# Patient Record
(demographics unavailable — no encounter records)

---

## 2024-11-15 NOTE — ASSESSMENT
[FreeTextEntry1] : He has 3 lesions in the left parotid gland which are likely intraparotid lymph nodes.  We reviewed the ultrasound results.  Cysts versus Warthin's tumor could not be ruled out.  Malignancy was not felt to be likely.  The lesion he had palpated has essentially resolved.  He has mildly asymmetric tonsils.  They are benign appearing.  He has no symptoms  Plan -Findings and management options were discussed with the patient. - We discussed management options for the parotid gland lesions.  He may consider ultrasound-guided FNA or an MRI.  He was told the only way to rule out malignancy would be to perform a biopsy.  At this point, he would like to obtain the MRI.  The MRI will also image the tonsils. - We will continue to monitor the tonsils closely - Follow-up after the MRI - Call and return earlier if any problems

## 2024-11-15 NOTE — PHYSICAL EXAM
[TextEntry] : General: The patient was alert, oriented and in no distress. Voice was clear.  Face: The patient had no facial asymmetry or mass (see neck exam). The skin was unremarkable.  Ears: Hearing normal to conversational voice External ears were normal without deformity. Ear canals were clear. No cerumen or inflammation Tympanic membranes were intact and normal. No perforation or effusion. mobile  Nose: The external nose had no significant deformity. On anterior rhinoscopy, the nasal mucosa was clear. The anterior septum was mildly deviated. There were no visualized polyps, purulence or masses.  Oral cavity: Oral mucosa- normal. Oral and base of tongue- clear and without mass. Gingival and buccal mucosa- moist and without lesions. Palate- the palate moved well. There was no cleft palate. There appeared to be good salivary flow. Oral cavity/oropharynx- no pus, erythema or mass there was asymmetry of the tonsils. the right tonsil was 2-3+ in size, the left tonsil was 1-2+ in size. both were soft and non-tender.  Neck: The neck was symmetrical. The parotid and submandibular glands were normal without masses. The trachea was midline and there was no unusual crepitus. Thyroid was smooth and nontender and no masses were palpated. no masses  Lymphatics: Cervical adenopathy- none.

## 2024-11-15 NOTE — HISTORY OF PRESENT ILLNESS
[de-identified] : - 9/13/24-  LUIZA ARCHER is a 45 year old patient here for 3-week history of a cyst or bump near his left ear. He thinks it has decreased in size this past week. There is no pain. He had no preceding event. He has no history of skin cancer or skin lesions. He has no nasal or throat symptoms. He does smoke. - [FreeTextEntry1] : - 11/15/24- LUIZA ARCHER is a 45 year old patient Here to review his ultrasound.  He had noticed a small cyst or bump near his left ear which started in late August.  He thinks it has decreased in size.  He was also noted to have mildly asymmetric tonsils.  He has no throat symptoms although he does smoke.  Nasal endoscopy and flexible laryngoscopy showed no suspicious masses or lesions.  Ultrasound showed 3 lesions in the left parotid gland likely representing intraparotid lymph nodes although simple cysts or cystic neoplasm such as Warthin's tumor cannot be ruled out.  Malignancy was not likely.  MRI or tissue sampling could be obtained for further evaluation  Translation service used.

## 2025-02-24 NOTE — HISTORY OF PRESENT ILLNESS
[de-identified] :  - 9/13/24- LUIZA ARCHER is a 45 year old patient here for 3-week history of a cyst or bump near his left ear. He thinks it has decreased in size this past week. There is no pain. He had no preceding event. He has no history of skin cancer or skin lesions. He has no nasal or throat symptoms. He does smoke. -   Interval History: - 11/15/24- LUIZA ARCHER is a 45 year old patient Here to review his ultrasound. He had noticed a small cyst or bump near his left ear which started in late August. He thinks it has decreased in size. He was also noted to have mildly asymmetric tonsils. He has no throat symptoms although he does smoke. Nasal endoscopy and flexible laryngoscopy showed no suspicious masses or lesions. Ultrasound showed 3 lesions in the left parotid gland likely representing intraparotid lymph nodes although simple cysts or cystic neoplasm such as Warthin's tumor cannot be ruled out. Malignancy was not likely. MRI or tissue sampling could be obtained for further evaluation - [FreeTextEntry1] : - 2/24/25- LUIZA ARCHER is a 45 year old patient Here for follow-up for parotid gland lesions and asymmetric tonsils.  He had opted to go for an MRI but did not follow through with it until February. MRI shows small lymph nodes in the left parotid gland and minimal asymmetry of the palatine tonsils without a focal lesion.  He does not currently have any symptoms.  The nodule in the left parotid gland remains small.   #[id number]

## 2025-02-24 NOTE — PHYSICAL EXAM
[TextEntry] : General: The patient was alert, oriented and in no distress. Voice was clear.  Face: The patient had no facial asymmetry or mass  The skin was unremarkable.  Ears: Hearing normal to conversational voice External ears were normal without deformity. Ear canals were clear. No cerumen or inflammation Tympanic membranes were intact and normal. No perforation or effusion. mobile  Nose: The external nose had no significant deformity. On anterior rhinoscopy, the nasal mucosa was clear. The anterior septum was mildly deviated. There were no visualized polyps, purulence or masses.  Oral cavity: Oral mucosa- normal. Oral and base of tongue- clear and without mass. Gingival and buccal mucosa- moist and without lesions. Palate- the palate moved well. There was no cleft palate. There appeared to be good salivary flow. Oral cavity/oropharynx- no pus, erythema or mass there was asymmetry of the tonsils. the right tonsil was 2-3+ in size, the left tonsil was 1-2+ in size. both were soft and non-tender.  Neck: The neck was symmetrical. The parotid and submandibular glands were normal without masses. The trachea was midline and there was no unusual crepitus. Thyroid was smooth and nontender and no masses were palpated. no masses  Lymphatics: Cervical adenopathy- none.

## 2025-02-24 NOTE — ASSESSMENT
[FreeTextEntry1] : The MRI showed the lesions in the left parotid gland consistent with small lymph nodes.  They have remained small and are not palpable.  The tonsils were also benign appearing.  Plan -Findings and management options were discussed with the patient. - We will continue with observation - Follow-up in 3 to 6 months - Call and return earlier if any problems or worsening symptoms